# Patient Record
Sex: FEMALE
[De-identification: names, ages, dates, MRNs, and addresses within clinical notes are randomized per-mention and may not be internally consistent; named-entity substitution may affect disease eponyms.]

---

## 2020-10-01 ENCOUNTER — OFFICE VISIT (OUTPATIENT)
Age: 60
End: 2020-10-01

## 2020-11-10 ENCOUNTER — OFFICE VISIT (OUTPATIENT)
Dept: URBAN - METROPOLITAN AREA CLINIC 7 | Facility: CLINIC | Age: 60
End: 2020-11-10

## 2020-11-19 ENCOUNTER — LAB OUTSIDE AN ENCOUNTER (OUTPATIENT)
Age: 60
End: 2020-11-19

## 2020-11-26 LAB
ALBUMIN: (no result)
ALKALINE PHOSPHATASE: (no result)
ALPHA 2-MACROGLOBULINS, QN: (no result)
ALT (SGPT) P5P: (no result)
ALT (SGPT): (no result)
AMBIG ABBREV HFP7 DEFAULT: (no result)
APOLIPOPROTEIN A-1: (no result)
ASH GRADE: (no result)
ASH SCORE: 0.01
AST (SGOT) P5P: (no result)
AST (SGOT): (no result)
BILIRUBIN, DIRECT: (no result)
BILIRUBIN, TOTAL: (no result)
BILIRUBIN, TOTAL: (no result)
C DIFFICILE TOXIN GENE NAA: NEGATIVE
CHOLESTEROL, TOTAL: (no result)
FIBROSIS SCORE: 0.1
FIBROSIS STAGE: (no result)
GGT: (no result)
GLUCOSE, SERUM: (no result)
HAPTOGLOBIN: (no result)
HEIGHT:: (no result)
HEP C VIRUS AB: (no result)
INTERPRETATION:: (no result)
Lab: (no result)
OVA + PARASITE EXAM: (no result)
PROTEIN, TOTAL: (no result)
STEATOSIS GRADE: (no result)
STEATOSIS SCORE: 0.47
TRIGLYCERIDES: (no result)
WEIGHT:: (no result)

## 2020-12-21 ENCOUNTER — TELEPHONE ENCOUNTER (OUTPATIENT)
Dept: URBAN - METROPOLITAN AREA CLINIC 9 | Facility: CLINIC | Age: 60
End: 2020-12-21

## 2021-12-18 ENCOUNTER — NEW PATIENT (OUTPATIENT)
Dept: URBAN - METROPOLITAN AREA CLINIC 26 | Facility: CLINIC | Age: 61
End: 2021-12-18

## 2021-12-18 VITALS — HEIGHT: 62 IN | WEIGHT: 139 LBS | BODY MASS INDEX: 25.58 KG/M2

## 2021-12-18 DIAGNOSIS — H43.811: ICD-10-CM

## 2021-12-18 DIAGNOSIS — H35.411: ICD-10-CM

## 2021-12-18 DIAGNOSIS — H57.11: ICD-10-CM

## 2021-12-18 DIAGNOSIS — H20.00: ICD-10-CM

## 2021-12-18 PROCEDURE — 92134 CPTRZ OPH DX IMG PST SGM RTA: CPT

## 2021-12-18 PROCEDURE — 99204 OFFICE O/P NEW MOD 45 MIN: CPT

## 2021-12-18 PROCEDURE — 92250 FUNDUS PHOTOGRAPHY W/I&R: CPT

## 2021-12-18 PROCEDURE — 67145 PROPH RTA DTCHMNT PC: CPT

## 2021-12-18 RX ORDER — PREDNISOLONE ACETATE 10 MG/ML: 1 SUSPENSION/ DROPS OPHTHALMIC

## 2021-12-18 ASSESSMENT — TONOMETRY
OD_IOP_MMHG: 12
OS_IOP_MMHG: 10

## 2021-12-18 ASSESSMENT — VISUAL ACUITY
OD_SC: 20/20-1
OS_SC: 20/20-2

## 2021-12-18 NOTE — PATIENT DISCUSSION
Recommend LASER to reduce risk of RETINAL DETACHMENT GIVEN THE PRESENCE OF TWO TEARS AT THE EDGE OF THE LATTICE.

## 2021-12-18 NOTE — PROCEDURE NOTE: CLINICAL
PROCEDURE NOTE: Laser for Lattice Degeneration OD. Diagnosis: Lattice Degeneration of Retina. Anesthesia: Topical. Prior to laser, risks/benefits/alternatives to laser discussed including loss of vision, decreased peripheral and night vision, need for more laser and/or surgery and patient wished to proceed. An informed consent was obtained and no assurances or guarantees were given. Spot size: 200 um. Power: 170 mW. Pulse duration: 170 ms. Number:[id number] . Procedure Time: 1142am. Patient tolerated procedure well. There were no complications. Post procedure instructions given. Patient given office phone number/answering service number and advised to call immediately should there be loss of vision or pain, or should they have any other questions or concerns. Jaye Aguilar

## 2021-12-18 NOTE — PATIENT DISCUSSION
AS ABOVE Reviewed the signs and symptoms of retinal tear/retinal detachment and the importance of calling for prompt evaluation should there be increasing floaters, new flashing lights, or decreasing peripheral vision in either eye at any time. Observation recommended.

## 2021-12-18 NOTE — PATIENT DISCUSSION
12/18/21: We reviewed the SIGNS and SYMPTOMS of RETINAL TEAR/RETINAL DETACHMENT and the importance of prompt evaluation should there be increasing floaters, new flashing lights, or decreasing peripheral vision in either eye at any time.

## 2021-12-18 NOTE — PATIENT DISCUSSION
Patient instructed to use topical steroids. VERY MILD INFLAMMATION, LIKELY RELATED TO RELEASE OF PIGMENT.

## 2021-12-18 NOTE — PATIENT DISCUSSION
12/18/21: LATTICE WITH SOME RELEASE OF PIGMENT LIKELY CAUSE SOME IRRITATION AND PAIN. WE'LL DO A SHORT STEROID TAPER.

## 2022-01-27 ENCOUNTER — FOLLOW UP (OUTPATIENT)
Dept: URBAN - METROPOLITAN AREA CLINIC 26 | Facility: CLINIC | Age: 62
End: 2022-01-27

## 2022-01-27 VITALS
HEART RATE: 54 BPM | HEIGHT: 62 IN | WEIGHT: 140 LBS | DIASTOLIC BLOOD PRESSURE: 77 MMHG | BODY MASS INDEX: 25.76 KG/M2 | SYSTOLIC BLOOD PRESSURE: 125 MMHG

## 2022-01-27 DIAGNOSIS — H35.411: ICD-10-CM

## 2022-01-27 DIAGNOSIS — H43.811: ICD-10-CM

## 2022-01-27 PROCEDURE — 92250 FUNDUS PHOTOGRAPHY W/I&R: CPT

## 2022-01-27 PROCEDURE — 99024 POSTOP FOLLOW-UP VISIT: CPT

## 2022-01-27 ASSESSMENT — TONOMETRY
OD_IOP_MMHG: 12
OS_IOP_MMHG: 12

## 2022-01-27 ASSESSMENT — VISUAL ACUITY
OS_SC: 20/20-1
OD_SC: 20/20-1

## 2022-07-30 ENCOUNTER — TELEPHONE ENCOUNTER (OUTPATIENT)
Age: 62
End: 2022-07-30

## 2022-07-31 ENCOUNTER — TELEPHONE ENCOUNTER (OUTPATIENT)
Age: 62
End: 2022-07-31

## 2022-09-21 NOTE — PATIENT DISCUSSION
Patient is interested in pursuing LASIK.  She would be an excellent candidate for monovision if she can tolerate it.  She had a CL trial with Dr. Katiuska Foster in the office and liked the result.  Will reach out to Dr. Katiuska Foster and ask if the CL trial was performed for a few days to make sure that she can tolerate it.  If not, will make sure that she can tolerate it in her daily life and while driving at night before surgery.  If she tolerates it well, recommend i-LASIK OD GOAL -2.00. Patient was told to remove eye lash extensions 1 week before surgery, wait 4 weeks after to put back on. Also will improve dry eye in the meantime- start PFATs 4-6x/day until surgery.

## 2022-09-21 NOTE — PATIENT DISCUSSION
9/22/22 pm: Better than [place] +2.00, improved range, but more difficulty with near. removed CLS, RTC Tuesday am and try [place] +1.75.

## 2022-09-21 NOTE — PATIENT DISCUSSION
9/22/22 am: Pt unable to I/R contacts. Contact follow up, near point too close, exchange CL [place] +1.50 8.4/14.0, RTC to reassess.

## 2022-10-03 NOTE — PATIENT DISCUSSION
MR DAY OF SURGERY.
Patient is interested in pursuing LASIK.  She would be an excellent candidate for monovision if she can tolerate it.  She had a CL trial with Dr. Kayla Schneider in the office and liked the result.  Will reach out to Dr. Kayla Schneider and ask if the CL trial was performed for a few days to make sure that she can tolerate it.  If not, will make sure that she can tolerate it in her daily life and while driving at night before surgery.  If she tolerates it well, recommend i-LASIK OD GOAL -2.00. Patient was told to remove eye lash extensions 1 week before surgery, wait 4 weeks after to put back on. Also will improve dry eye in the meantime- start PFATs 4-6x/day until surgery.
Patient made aware of 24/7 emergency services.
26-Sep-2022

## 2022-10-12 NOTE — PATIENT DISCUSSION
10/12/22: CL trial pt prefers +1.75. LVC Goal -1.75.
9/21/22: CL Trial [place] +2.00 8.4, pt appreciates improvement. DW. RTC 3 days and reassess.
9/22/22 am: Pt unable to I/R contacts. Contact follow up, near point too close, exchange CL [place] +1.50 8.4/14.0, RTC to reassess.
9/22/22 pm: Better than [place] +2.00, improved range, but more difficulty with near. removed CLS, RTC Tuesday am and try [place] +1.75.
MR DAY OF SURGERY.
Patient is interested in pursuing LASIK.  She would be an excellent candidate for monovision if she can tolerate it.  She had a CL trial with Dr. Clive Antunez in the office and liked the result.  Will reach out to Dr. Clive Antunez and ask if the CL trial was performed for a few days to make sure that she can tolerate it.  If not, will make sure that she can tolerate it in her daily life and while driving at night before surgery.  If she tolerates it well, recommend i-LASIK OD GOAL -2.00. Patient was told to remove eye lash extensions 1 week before surgery, wait 4 weeks after to put back on. Also will improve dry eye in the meantime- start PFATs 4-6x/day until surgery.
Patient made aware of 24/7 emergency services.
Statement Selected

## 2022-10-19 NOTE — PATIENT DISCUSSION
9/22/22 pm: Better than [place] +2.00, improved range, but more difficulty with near. removed CLS, RTC Tuesday am and try [place] +1.75.

## 2022-10-19 NOTE — PATIENT DISCUSSION
9/22/22 am: Pt unable to I/R contacts. Contact follow up, near point too close, exchange CL [place] +1.50 8.4/14.0, RTC to reassess.

## 2022-10-19 NOTE — PATIENT DISCUSSION
Patient is interested in pursuing LASIK.  She would be an excellent candidate for monovision if she can tolerate it.  She had a CL trial with Dr. Duncan Horta in the office and liked the result.  Will reach out to Dr. Duncan Horta and ask if the CL trial was performed for a few days to make sure that she can tolerate it.  If not, will make sure that she can tolerate it in her daily life and while driving at night before surgery.  If she tolerates it well, recommend i-LASIK OD GOAL -2.00. Patient was told to remove eye lash extensions 1 week before surgery, wait 4 weeks after to put back on. Also will improve dry eye in the meantime- start PFATs 4-6x/day until surgery.

## 2022-10-20 NOTE — PATIENT DISCUSSION
9/22/22 pm: Better than [place] +2.00, improved range, but more difficulty with near. removed CLS, RTC Tuesday am and try [place] +1.75.

## 2022-10-20 NOTE — PATIENT DISCUSSION
9/22/22 am: Pt unable to I/R contacts. Contact follow up, near point too close, exchange CL [place] +1.50 8.4/14.0, RTC to reassess.

## 2022-10-20 NOTE — PATIENT DISCUSSION
Patient is interested in pursuing LASIK.  She would be an excellent candidate for monovision if she can tolerate it.  She had a CL trial with Dr. Jose Parada in the office and liked the result.  Will reach out to Dr. Jose Parada and ask if the CL trial was performed for a few days to make sure that she can tolerate it.  If not, will make sure that she can tolerate it in her daily life and while driving at night before surgery.  If she tolerates it well, recommend i-LASIK OD GOAL -2.00. Patient was told to remove eye lash extensions 1 week before surgery, wait 4 weeks after to put back on. Also will improve dry eye in the meantime- start PFATs 4-6x/day until surgery.

## 2022-10-26 NOTE — PATIENT DISCUSSION
Patient is interested in pursuing LASIK.  She would be an excellent candidate for monovision if she can tolerate it.  She had a CL trial with Dr. Nisha Hollis in the office and liked the result.  Will reach out to Dr. Nisha Hollis and ask if the CL trial was performed for a few days to make sure that she can tolerate it.  If not, will make sure that she can tolerate it in her daily life and while driving at night before surgery.  If she tolerates it well, recommend i-LASIK OD GOAL -2.00. Patient was told to remove eye lash extensions 1 week before surgery, wait 4 weeks after to put back on. Also will improve dry eye in the meantime- start PFATs 4-6x/day until surgery.

## 2022-10-26 NOTE — PATIENT DISCUSSION
9/22/22 am: Pt unable to I/R contacts. Contact follow up, near point too close, exchange CL [place] +1.50 8.4/14.0, RTC to reassess.

## 2022-10-26 NOTE — PATIENT DISCUSSION
9/22/22 pm: Better than [place] +2.00, improved range, but more difficulty with near. removed CLS, RTC Tuesday am and try [place] +1.75.

## 2022-10-26 NOTE — PATIENT DISCUSSION
9/21/22: CL Trial [place] +2.00 8.4, pt appreciates improvement. DW. RTC 3 days and reassess.

## 2022-11-30 NOTE — PATIENT DISCUSSION
9/22/22 am: Pt unable to I/R contacts. Contact follow up, near point too close, exchange CL [place] +1.50 8.4/14.0, RTC to reassess.

## 2022-11-30 NOTE — PATIENT DISCUSSION
9/22/22 pm: Better than [place] +2.00, improved range, but more difficulty with near. removed CLS, RTC Tuesday am and try [place] +1.75.

## 2023-01-18 ENCOUNTER — FOLLOW UP (OUTPATIENT)
Dept: URBAN - METROPOLITAN AREA CLINIC 26 | Facility: CLINIC | Age: 63
End: 2023-01-18

## 2023-01-18 VITALS — WEIGHT: 135 LBS | HEIGHT: 62 IN | BODY MASS INDEX: 24.84 KG/M2

## 2023-01-18 DIAGNOSIS — H20.00: ICD-10-CM

## 2023-01-18 DIAGNOSIS — H04.123: ICD-10-CM

## 2023-01-18 DIAGNOSIS — H35.411: ICD-10-CM

## 2023-01-18 DIAGNOSIS — H43.811: ICD-10-CM

## 2023-01-18 PROCEDURE — 92014 COMPRE OPH EXAM EST PT 1/>: CPT

## 2023-01-18 PROCEDURE — 92134 CPTRZ OPH DX IMG PST SGM RTA: CPT

## 2023-01-18 PROCEDURE — 92250 FUNDUS PHOTOGRAPHY W/I&R: CPT

## 2023-01-18 ASSESSMENT — VISUAL ACUITY
OD_SC: 20/20-2
OS_SC: 20/20-1

## 2023-01-18 ASSESSMENT — TONOMETRY
OD_IOP_MMHG: 14
OS_IOP_MMHG: 13

## 2024-01-19 ENCOUNTER — COMPREHENSIVE EXAM (OUTPATIENT)
Dept: URBAN - METROPOLITAN AREA CLINIC 26 | Facility: CLINIC | Age: 64
End: 2024-01-19

## 2024-01-19 VITALS — WEIGHT: 129 LBS | HEIGHT: 64 IN | BODY MASS INDEX: 22.02 KG/M2

## 2024-01-19 DIAGNOSIS — H02.831: ICD-10-CM

## 2024-01-19 DIAGNOSIS — H20.00: ICD-10-CM

## 2024-01-19 DIAGNOSIS — H04.123: ICD-10-CM

## 2024-01-19 DIAGNOSIS — Z96.1: ICD-10-CM

## 2024-01-19 DIAGNOSIS — H57.11: ICD-10-CM

## 2024-01-19 DIAGNOSIS — H35.411: ICD-10-CM

## 2024-01-19 DIAGNOSIS — H02.834: ICD-10-CM

## 2024-01-19 DIAGNOSIS — H43.811: ICD-10-CM

## 2024-01-19 PROCEDURE — 92134 CPTRZ OPH DX IMG PST SGM RTA: CPT

## 2024-01-19 PROCEDURE — 99213 OFFICE O/P EST LOW 20 MIN: CPT

## 2024-01-19 PROCEDURE — 92250 FUNDUS PHOTOGRAPHY W/I&R: CPT

## 2024-01-19 ASSESSMENT — TONOMETRY
OS_IOP_MMHG: 15
OD_IOP_MMHG: 15

## 2024-01-19 ASSESSMENT — VISUAL ACUITY
OD_CC: 20/20-2
OS_CC: 20/25+2

## 2024-07-09 ENCOUNTER — DASHBOARD ENCOUNTERS (OUTPATIENT)
Age: 64
End: 2024-07-09

## 2024-07-09 ENCOUNTER — LAB OUTSIDE AN ENCOUNTER (OUTPATIENT)
Dept: URBAN - METROPOLITAN AREA CLINIC 7 | Facility: CLINIC | Age: 64
End: 2024-07-09

## 2024-07-09 ENCOUNTER — TELEPHONE ENCOUNTER (OUTPATIENT)
Dept: URBAN - METROPOLITAN AREA CLINIC 7 | Facility: CLINIC | Age: 64
End: 2024-07-09

## 2024-07-09 ENCOUNTER — OFFICE VISIT (OUTPATIENT)
Dept: URBAN - METROPOLITAN AREA CLINIC 7 | Facility: CLINIC | Age: 64
End: 2024-07-09
Payer: MEDICARE

## 2024-07-09 VITALS
HEIGHT: 62 IN | DIASTOLIC BLOOD PRESSURE: 70 MMHG | WEIGHT: 128 LBS | SYSTOLIC BLOOD PRESSURE: 116 MMHG | TEMPERATURE: 97.9 F | BODY MASS INDEX: 23.55 KG/M2

## 2024-07-09 DIAGNOSIS — K59.09 CHRONIC CONSTIPATION: ICD-10-CM

## 2024-07-09 DIAGNOSIS — R13.14 PHARYNGOESOPHAGEAL DYSPHAGIA: ICD-10-CM

## 2024-07-09 DIAGNOSIS — R13.19 ESOPHAGEAL DYSPHAGIA: ICD-10-CM

## 2024-07-09 DIAGNOSIS — K64.9 BLEEDING HEMORRHOIDS: ICD-10-CM

## 2024-07-09 PROBLEM — 40739000: Status: ACTIVE | Noted: 2024-07-09

## 2024-07-09 PROBLEM — 236069009: Status: ACTIVE | Noted: 2024-07-09

## 2024-07-09 PROBLEM — 51551000: Status: ACTIVE | Noted: 2024-07-09

## 2024-07-09 PROBLEM — 40890009: Status: ACTIVE | Noted: 2024-07-09

## 2024-07-09 PROBLEM — 305058001: Status: ACTIVE | Noted: 2024-07-09

## 2024-07-09 PROCEDURE — 99204 OFFICE O/P NEW MOD 45 MIN: CPT | Performed by: INTERNAL MEDICINE

## 2024-07-09 RX ORDER — LEVOTHYROXINE SODIUM 75 UG/1
TABLET ORAL
Qty: 90 TABLET | Status: ACTIVE | COMMUNITY

## 2024-07-09 RX ORDER — FLUTICASONE PROPIONATE 50 UG/1
SPRAY, METERED NASAL
Qty: 48 | Status: ACTIVE | COMMUNITY

## 2024-07-09 RX ORDER — ALPRAZOLAM 0.5 MG/1
TABLET ORAL
Qty: 30 TABLET | Status: ACTIVE | COMMUNITY

## 2024-07-09 RX ORDER — MELOXICAM 7.5 MG/1
TABLET ORAL
Qty: 30 TABLET | Status: ON HOLD | COMMUNITY

## 2024-07-09 RX ORDER — MELOXICAM 7.5 MG/1
TABLET ORAL
Qty: 30 TABLET | Status: ACTIVE | COMMUNITY

## 2024-07-09 RX ORDER — L.ACID,FERM,PLA,RHA/B.BIF,LONG 126 MG
AS DIRECTED TABLET, DELAYED AND EXTENDED RELEASE ORAL
Status: ACTIVE | COMMUNITY

## 2024-07-09 RX ORDER — ESCITALOPRAM OXALATE 10 MG/1
2 IN THE MORNING TABLET, FILM COATED ORAL ONCE A DAY
Status: ACTIVE | COMMUNITY

## 2024-07-09 NOTE — HPI-TODAY'S VISIT:
Recurrent intermittent dysphagia to solids. Symptoms are chronic but recently worse. Has noted increased difficulty with pills. No recent  food bolus impactions . No difficulty with liquids but is having cough with swallowing.. Obtains relief with repeated swallowing,taking of liquids and at times inducing regurgitation of the swallowed food material. No associated pulmonary complaints and no current symptoms of oropharyngeal dysphagia. No fevers or chills. No bleeding. No night sweats. Has longstanding hx of GERD but no current exacerbation of GERD symptoms.  Hx chronic constipation and hx of hemorrhoidal bleeding.

## 2024-08-01 ENCOUNTER — CLAIMS CREATED FROM THE CLAIM WINDOW (OUTPATIENT)
Dept: URBAN - METROPOLITAN AREA SURGERY CENTER 5 | Facility: SURGERY CENTER | Age: 64
End: 2024-08-01
Payer: MEDICARE

## 2024-08-01 ENCOUNTER — CLAIMS CREATED FROM THE CLAIM WINDOW (OUTPATIENT)
Dept: URBAN - METROPOLITAN AREA CLINIC 4 | Facility: CLINIC | Age: 64
End: 2024-08-01
Payer: MEDICARE

## 2024-08-01 DIAGNOSIS — K31.89 OTHER DISEASES OF STOMACH AND DUODENUM: ICD-10-CM

## 2024-08-01 DIAGNOSIS — R13.10 DYSPHAGIA, UNSPECIFIED TYPE: ICD-10-CM

## 2024-08-01 DIAGNOSIS — K29.70 GASTRITIS, UNSPECIFIED, WITHOUT BLEEDING: ICD-10-CM

## 2024-08-01 DIAGNOSIS — K29.70 GASTRITIS: ICD-10-CM

## 2024-08-01 DIAGNOSIS — K30 FUNCTIONAL DYSPEPSIA: ICD-10-CM

## 2024-08-01 DIAGNOSIS — K21.9 ESOPHAGEAL REFLUX: ICD-10-CM

## 2024-08-01 DIAGNOSIS — K21.9 GASTRO-ESOPHAGEAL REFLUX DISEASE WITHOUT ESOPHAGITIS: ICD-10-CM

## 2024-08-01 DIAGNOSIS — K44.9 DIAPHRAGMATIC HERNIA WITHOUT OBSTRUCTION OR GANGRENE: ICD-10-CM

## 2024-08-01 DIAGNOSIS — K44.9 HIATAL HERNIA: ICD-10-CM

## 2024-08-01 PROCEDURE — 43248 EGD GUIDE WIRE INSERTION: CPT | Performed by: INTERNAL MEDICINE

## 2024-08-01 PROCEDURE — 88305 TISSUE EXAM BY PATHOLOGIST: CPT | Performed by: PATHOLOGY

## 2024-08-01 PROCEDURE — 43239 EGD BIOPSY SINGLE/MULTIPLE: CPT | Performed by: INTERNAL MEDICINE

## 2024-08-01 PROCEDURE — 43239 EGD BIOPSY SINGLE/MULTIPLE: CPT | Performed by: CLINIC/CENTER

## 2024-08-01 PROCEDURE — 00731 ANES UPR GI NDSC PX NOS: CPT | Performed by: NURSE ANESTHETIST, CERTIFIED REGISTERED

## 2024-08-01 PROCEDURE — 88342 IMHCHEM/IMCYTCHM 1ST ANTB: CPT | Performed by: PATHOLOGY

## 2024-08-01 PROCEDURE — 43248 EGD GUIDE WIRE INSERTION: CPT | Performed by: CLINIC/CENTER

## 2024-08-15 ENCOUNTER — CLAIMS CREATED FROM THE CLAIM WINDOW (OUTPATIENT)
Dept: URBAN - METROPOLITAN AREA SURGERY CENTER 5 | Facility: SURGERY CENTER | Age: 64
End: 2024-08-15
Payer: MEDICARE

## 2024-08-15 ENCOUNTER — CLAIMS CREATED FROM THE CLAIM WINDOW (OUTPATIENT)
Dept: URBAN - METROPOLITAN AREA CLINIC 4 | Facility: CLINIC | Age: 64
End: 2024-08-15
Payer: MEDICARE

## 2024-08-15 DIAGNOSIS — Z12.11 ENCOUNTER FOR SCREENING FOR MALIGNANT NEOPLASM OF COLON: ICD-10-CM

## 2024-08-15 DIAGNOSIS — K63.89 OTHER SPECIFIED DISEASES OF INTESTINE: ICD-10-CM

## 2024-08-15 DIAGNOSIS — K63.5 POLYP OF SIGMOID COLON, UNSPECIFIED TYPE: ICD-10-CM

## 2024-08-15 DIAGNOSIS — K64.8 OTHER HEMORRHOIDS: ICD-10-CM

## 2024-08-15 DIAGNOSIS — K63.5 POLYP OF COLON: ICD-10-CM

## 2024-08-15 DIAGNOSIS — K64.1 SECOND DEGREE HEMORRHOIDS: ICD-10-CM

## 2024-08-15 DIAGNOSIS — K63.89 MELANOSIS, COLON: ICD-10-CM

## 2024-08-15 DIAGNOSIS — Z12.11 ENCOUNTER SCREENING FOR MALIGNANT NEOPLASM OF COLON: ICD-10-CM

## 2024-08-15 PROCEDURE — 45385 COLONOSCOPY W/LESION REMOVAL: CPT | Performed by: INTERNAL MEDICINE

## 2024-08-15 PROCEDURE — 88305 TISSUE EXAM BY PATHOLOGIST: CPT | Performed by: PATHOLOGY

## 2024-08-15 PROCEDURE — 45385 COLONOSCOPY W/LESION REMOVAL: CPT | Performed by: CLINIC/CENTER

## 2024-08-15 PROCEDURE — 00811 ANES LWR INTST NDSC NOS: CPT | Performed by: NURSE ANESTHETIST, CERTIFIED REGISTERED

## 2024-09-11 ENCOUNTER — TELEPHONE ENCOUNTER (OUTPATIENT)
Dept: URBAN - METROPOLITAN AREA CLINIC 7 | Facility: CLINIC | Age: 64
End: 2024-09-11

## 2024-09-17 ENCOUNTER — OFFICE VISIT (OUTPATIENT)
Dept: URBAN - METROPOLITAN AREA CLINIC 7 | Facility: CLINIC | Age: 64
End: 2024-09-17

## 2024-10-02 ENCOUNTER — TELEPHONE ENCOUNTER (OUTPATIENT)
Dept: URBAN - METROPOLITAN AREA CLINIC 7 | Facility: CLINIC | Age: 64
End: 2024-10-02

## 2024-10-02 ENCOUNTER — OFFICE VISIT (OUTPATIENT)
Dept: URBAN - METROPOLITAN AREA CLINIC 7 | Facility: CLINIC | Age: 64
End: 2024-10-02
Payer: MEDICARE

## 2024-10-02 VITALS — HEIGHT: 62 IN

## 2024-10-02 VITALS — HEIGHT: 62 IN | SYSTOLIC BLOOD PRESSURE: 120 MMHG | DIASTOLIC BLOOD PRESSURE: 70 MMHG

## 2024-10-02 DIAGNOSIS — K64.1 GRADE II HEMORRHOIDS: ICD-10-CM

## 2024-10-02 PROCEDURE — 46221 LIGATION OF HEMORRHOID(S): CPT | Performed by: INTERNAL MEDICINE

## 2024-10-02 RX ORDER — MELOXICAM 7.5 MG/1
TABLET ORAL
Qty: 30 TABLET | Status: ON HOLD | COMMUNITY

## 2024-10-02 RX ORDER — MELOXICAM 7.5 MG/1
TABLET ORAL
Qty: 30 TABLET | Status: ACTIVE | COMMUNITY

## 2024-10-02 RX ORDER — L.ACID,FERM,PLA,RHA/B.BIF,LONG 126 MG
AS DIRECTED TABLET, DELAYED AND EXTENDED RELEASE ORAL
Status: ACTIVE | COMMUNITY

## 2024-10-02 RX ORDER — ESCITALOPRAM OXALATE 10 MG/1
2 IN THE MORNING TABLET, FILM COATED ORAL ONCE A DAY
Status: ACTIVE | COMMUNITY

## 2024-10-02 RX ORDER — ALPRAZOLAM 0.5 MG/1
TABLET ORAL
Qty: 30 TABLET | Status: ACTIVE | COMMUNITY

## 2024-10-02 RX ORDER — LEVOTHYROXINE SODIUM 75 UG/1
TABLET ORAL
Qty: 90 TABLET | Status: ACTIVE | COMMUNITY

## 2024-10-02 RX ORDER — FLUTICASONE PROPIONATE 50 UG/1
SPRAY, METERED NASAL
Qty: 48 | Status: ACTIVE | COMMUNITY

## 2024-10-16 ENCOUNTER — OFFICE VISIT (OUTPATIENT)
Dept: URBAN - METROPOLITAN AREA CLINIC 7 | Facility: CLINIC | Age: 64
End: 2024-10-16
Payer: MEDICARE

## 2024-10-16 VITALS
HEIGHT: 62 IN | OXYGEN SATURATION: 96 % | DIASTOLIC BLOOD PRESSURE: 74 MMHG | SYSTOLIC BLOOD PRESSURE: 144 MMHG | HEART RATE: 61 BPM

## 2024-10-16 DIAGNOSIS — K64.1 GRADE II HEMORRHOIDS: ICD-10-CM

## 2024-10-16 PROCEDURE — 46221 LIGATION OF HEMORRHOID(S): CPT | Performed by: INTERNAL MEDICINE

## 2024-10-16 RX ORDER — ALPRAZOLAM 0.5 MG/1
TABLET ORAL
Qty: 30 TABLET | Status: ACTIVE | COMMUNITY

## 2024-10-16 RX ORDER — MELOXICAM 7.5 MG/1
TABLET ORAL
Qty: 30 TABLET | Status: ON HOLD | COMMUNITY

## 2024-10-16 RX ORDER — FLUTICASONE PROPIONATE 50 UG/1
SPRAY, METERED NASAL
Qty: 48 | Status: ACTIVE | COMMUNITY

## 2024-10-16 RX ORDER — ESCITALOPRAM OXALATE 10 MG/1
2 IN THE MORNING TABLET, FILM COATED ORAL ONCE A DAY
Status: ACTIVE | COMMUNITY

## 2024-10-16 RX ORDER — LEVOTHYROXINE SODIUM 75 UG/1
TABLET ORAL
Qty: 90 TABLET | Status: ACTIVE | COMMUNITY

## 2024-10-16 RX ORDER — MELOXICAM 7.5 MG/1
TABLET ORAL
Qty: 30 TABLET | Status: ACTIVE | COMMUNITY

## 2024-10-16 RX ORDER — L.ACID,FERM,PLA,RHA/B.BIF,LONG 126 MG
AS DIRECTED TABLET, DELAYED AND EXTENDED RELEASE ORAL
Status: ACTIVE | COMMUNITY

## 2024-10-30 ENCOUNTER — OFFICE VISIT (OUTPATIENT)
Dept: URBAN - METROPOLITAN AREA CLINIC 7 | Facility: CLINIC | Age: 64
End: 2024-10-30
Payer: MEDICARE

## 2024-10-30 VITALS
HEART RATE: 65 BPM | DIASTOLIC BLOOD PRESSURE: 78 MMHG | SYSTOLIC BLOOD PRESSURE: 118 MMHG | HEIGHT: 62 IN | OXYGEN SATURATION: 97 %

## 2024-10-30 DIAGNOSIS — K64.1 GRADE II HEMORRHOIDS: ICD-10-CM

## 2024-10-30 PROCEDURE — 46221 LIGATION OF HEMORRHOID(S): CPT | Performed by: INTERNAL MEDICINE

## 2024-10-30 RX ORDER — MELOXICAM 7.5 MG/1
TABLET ORAL
Qty: 30 TABLET | Status: ACTIVE | COMMUNITY

## 2024-10-30 RX ORDER — LEVOTHYROXINE SODIUM 75 UG/1
TABLET ORAL
Qty: 90 TABLET | Status: ACTIVE | COMMUNITY

## 2024-10-30 RX ORDER — MELOXICAM 7.5 MG/1
TABLET ORAL
Qty: 30 TABLET | Status: ON HOLD | COMMUNITY

## 2024-10-30 RX ORDER — ESCITALOPRAM OXALATE 10 MG/1
2 IN THE MORNING TABLET, FILM COATED ORAL ONCE A DAY
Status: ACTIVE | COMMUNITY

## 2024-10-30 RX ORDER — L.ACID,FERM,PLA,RHA/B.BIF,LONG 126 MG
AS DIRECTED TABLET, DELAYED AND EXTENDED RELEASE ORAL
Status: ACTIVE | COMMUNITY

## 2024-10-30 RX ORDER — FLUTICASONE PROPIONATE 50 UG/1
SPRAY, METERED NASAL
Qty: 48 | Status: ACTIVE | COMMUNITY

## 2024-10-30 RX ORDER — ALPRAZOLAM 0.5 MG/1
TABLET ORAL
Qty: 30 TABLET | Status: ACTIVE | COMMUNITY